# Patient Record
Sex: MALE | Race: WHITE | Employment: OTHER | URBAN - METROPOLITAN AREA
[De-identification: names, ages, dates, MRNs, and addresses within clinical notes are randomized per-mention and may not be internally consistent; named-entity substitution may affect disease eponyms.]

---

## 2020-02-27 NOTE — PROGRESS NOTES
History of Present Illness - Blas Stout is a 64 year old male here to see me for the first time for nosebleeds.    He tells me that this started about 3 years ago.  It started about three years ago in the winter.  It started to bleed initially on the LEFT side, but now is both.  It would bleed and crust, and he would pick at it.  This would set up a vicious cycle of bleeding and crusting.  No previous ENT surgery.  No history of any nasal trauma, or change in nasal airway or vision.    Past Medical History -   Patient Active Problem List   Diagnosis     Hypercholesterolemia       Current Medications -   Current Outpatient Medications:      atorvastatin (LIPITOR) 10 MG tablet, Take 10 mg by mouth daily, Disp: , Rfl:      sildenafil (REVATIO) 20 MG tablet, TAKE 1-3 PILLS AT LEAST 1/2-HOUR BEFORE INTERCOURSE, Disp: , Rfl:      sildenafil (VIAGRA) 50 MG tablet, Take 1 tablet by mouth once daily if needed for Erectile Dysfunction. Take 30 min to 4 hours before sexual activity. Max 100mg/24hr, Disp: , Rfl:     Allergies - Not on File    Social History -   Social History     Socioeconomic History     Marital status:      Spouse name: Not on file     Number of children: Not on file     Years of education: Not on file     Highest education level: Not on file   Occupational History     Not on file   Social Needs     Financial resource strain: Not on file     Food insecurity:     Worry: Not on file     Inability: Not on file     Transportation needs:     Medical: Not on file     Non-medical: Not on file   Tobacco Use     Smoking status: Not on file   Substance and Sexual Activity     Alcohol use: Not on file     Drug use: Not on file     Sexual activity: Not on file   Lifestyle     Physical activity:     Days per week: Not on file     Minutes per session: Not on file     Stress: Not on file   Relationships     Social connections:     Talks on phone: Not on file     Gets together: Not on file     Attends [employer]  service: Not on file     Active member of club or organization: Not on file     Attends meetings of clubs or organizations: Not on file     Relationship status: Not on file     Intimate partner violence:     Fear of current or ex partner: Not on file     Emotionally abused: Not on file     Physically abused: Not on file     Forced sexual activity: Not on file   Other Topics Concern     Not on file   Social History Narrative     Not on file       Family History - No family history on file.    Review of Systems - As per HPI and PMHx, otherwise 10+ system review of the head and neck, and general constitution is negative.    Physical Exam  BP (!) 143/75   Pulse 52   Resp 14   SpO2 99%     General - The patient is well nourished and well developed, and appears to have good nutritional status.  Alert and oriented to person and place, answers questions and cooperates with examination appropriately.   Head and Face - Normocephalic and atraumatic, with no gross asymmetry noted of the contour of the facial features.  The facial nerve is intact, with strong symmetric movements.  Voice and Breathing - The patient was breathing comfortably without the use of accessory muscles. There was no wheezing, stridor, or stertor.  The patients voice was clear and strong, and had appropriate pitch and quality.  Ears - The tympanic membranes are normal in appearance, bony landmarks are intact.  No retraction, perforation, or masses.  No fluid or purulence was seen in the external canal or the middle ear. No evidence of infection of the middle ear or external canal, cerumen was normal in appearance.  Eyes - Extraocular movements intact, and the pupils were reactive to light.  Sclera were not icteric or injected, conjunctiva were pink and moist.  Mouth - Examination of the oral cavity showed pink, healthy oral mucosa. No lesions or ulcerations noted.  The tongue was mobile and midline, and the dentition were in good condition.    Throat -  The walls of the oropharynx were smooth, pink, moist, symmetric, and had no lesions or ulcerations.  The tonsillar pillars and soft palate were symmetric.  The uvula was midline on elevation.    Neck - Normal midline excursion of the laryngotracheal complex during swallowing.  Full range of motion on passive movement.  Palpation of the occipital, submental, submandibular, internal jugular chain, and supraclavicular nodes did not demonstrate any abnormal lymph nodes or masses.  The carotid pulse was palpable bilaterally.  Palpation of the thyroid was soft and smooth, with no nodules or goiter appreciated.  The trachea was mobile and midline.  Nose - External contour is symmetric, no gross deflection or scars.  Nasal mucosa is pink and moist with no abnormal mucus.  The septum shows a shallow excoriated ulceration bilaterally, LEFT greater than RIGHT.  No otther polyps or masses.        A/P - Blas Stout is a 64 year old male  (R04.0) Epistaxis  (primary encounter diagnosis)    The patient has been counseled that this is a vicious cycle of trauma to the mucosa, with crusting and picking that keeps digging out the ulcer deeper.    I will place him on Cleocin Gel and have him avoid pciking and this should allow it to heal.

## 2020-03-02 ENCOUNTER — OFFICE VISIT (OUTPATIENT)
Dept: OTOLARYNGOLOGY | Facility: CLINIC | Age: 65
End: 2020-03-02
Payer: COMMERCIAL

## 2020-03-02 VITALS
SYSTOLIC BLOOD PRESSURE: 143 MMHG | HEART RATE: 52 BPM | DIASTOLIC BLOOD PRESSURE: 75 MMHG | OXYGEN SATURATION: 99 % | RESPIRATION RATE: 14 BRPM

## 2020-03-02 DIAGNOSIS — E78.00 HYPERCHOLESTEROLEMIA: ICD-10-CM

## 2020-03-02 DIAGNOSIS — R04.0 EPISTAXIS: Primary | ICD-10-CM

## 2020-03-02 PROCEDURE — 99203 OFFICE O/P NEW LOW 30 MIN: CPT | Performed by: OTOLARYNGOLOGY

## 2020-03-02 RX ORDER — SILDENAFIL CITRATE 20 MG/1
TABLET ORAL
COMMUNITY
Start: 2019-05-23

## 2020-03-02 RX ORDER — CLINDAMYCIN PHOSPHATE 10 MG/G
1 GEL TOPICAL 2 TIMES DAILY
Qty: 30 G | Refills: 4 | Status: SHIPPED | OUTPATIENT
Start: 2020-03-02 | End: 2020-03-12

## 2020-03-02 RX ORDER — SILDENAFIL 50 MG/1
TABLET, FILM COATED ORAL
COMMUNITY
Start: 2020-02-14

## 2020-03-02 RX ORDER — ATORVASTATIN CALCIUM 10 MG/1
10 TABLET, FILM COATED ORAL DAILY
COMMUNITY
Start: 2020-01-22

## 2020-03-02 ASSESSMENT — PAIN SCALES - GENERAL: PAINLEVEL: NO PAIN (0)

## 2020-03-02 NOTE — PATIENT INSTRUCTIONS
Blas to follow up with Primary Care provider regarding elevated blood pressure.  Scheduling Information  To schedule your CT/MRI scan, please contact [facility] at [phone] OR [facility] at [phone]    To schedule your Surgery, please contact our Specialty Schedulers at [phone]      ENT Clinic Locations Clinic Hours Telephone Number     [facility]  [address] [address]   Monday:           1:00pm -- 5:00pm    Friday:              8:00am - 12:00pm   To schedule/reschedule an appointment with   Dr. Denton,   please contact our   Specialty Scheduling Department at:     [phone]       [facility] [place]  [address] [place] [address] Tuesday:          8:00am -- 2:00pm         Urgent Care Locations Clinic Hours Telephone Numbers     [facility] [place]  [address] [place] [address]     Monday-Friday:     11:00am - 9:00pm    Saturday-Sunday:  9:00am - 5:00pm   [phone]     [facility]  [address] [place] [address]     Monday-Friday:      5:00pm - 9:00pm     Saturday-Sunday:  9:00am - 5:00pm   [phone]

## 2020-03-02 NOTE — LETTER
3/2/2020         RE: Blas Stout  [address] [facility] [address]        Dear Colleague,    Thank you for referring your patient, Blas Stout, to the [facility]. Please see a copy of my visit note below.    History of Present Illness - Blas Stout is a 64 year old male here to see me for the first time for nosebleeds.    He tells me that this started about 3 years ago.  It started about three years ago in the winter.  It started to bleed initially on the LEFT side, but now is both.  It would bleed and crust, and he would pick at it.  This would set up a vicious cycle of bleeding and crusting.  No previous ENT surgery.  No history of any nasal trauma, or change in nasal airway or vision.    Past Medical History -   Patient Active Problem List   Diagnosis     Hypercholesterolemia       Current Medications -   Current Outpatient Medications:      atorvastatin (LIPITOR) 10 MG tablet, Take 10 mg by mouth daily, Disp: , Rfl:      sildenafil (REVATIO) 20 MG tablet, TAKE 1-3 PILLS AT LEAST 1/2-HOUR BEFORE INTERCOURSE, Disp: , Rfl:      sildenafil (VIAGRA) 50 MG tablet, Take 1 tablet by mouth once daily if needed for Erectile Dysfunction. Take 30 min to 4 hours before sexual activity. Max 100mg/24hr, Disp: , Rfl:     Allergies - Not on File    Social History -   Social History     Socioeconomic History     Marital status:      Spouse name: Not on file     Number of children: Not on file     Years of education: Not on file     Highest education level: Not on file   Occupational History     Not on file   Social Needs     Financial resource strain: Not on file     Food insecurity:     Worry: Not on file     Inability: Not on file     Transportation needs:     Medical: Not on file     Non-medical: Not on file   Tobacco Use     Smoking status: Not on file   Substance and Sexual Activity     Alcohol use: Not on file     Drug use: Not on file     Sexual activity: Not on file   Lifestyle      Physical activity:     Days per week: Not on file     Minutes per session: Not on file     Stress: Not on file   Relationships     Social connections:     Talks on phone: Not on file     Gets together: Not on file     Attends Taoism service: Not on file     Active member of club or organization: Not on file     Attends meetings of clubs or organizations: Not on file     Relationship status: Not on file     Intimate partner violence:     Fear of current or ex partner: Not on file     Emotionally abused: Not on file     Physically abused: Not on file     Forced sexual activity: Not on file   Other Topics Concern     Not on file   Social History Narrative     Not on file       Family History - No family history on file.    Review of Systems - As per HPI and PMHx, otherwise 10+ system review of the head and neck, and general constitution is negative.    Physical Exam  BP (!) 143/75   Pulse 52   Resp 14   SpO2 99%     General - The patient is well nourished and well developed, and appears to have good nutritional status.  Alert and oriented to person and place, answers questions and cooperates with examination appropriately.   Head and Face - Normocephalic and atraumatic, with no gross asymmetry noted of the contour of the facial features.  The facial nerve is intact, with strong symmetric movements.  Voice and Breathing - The patient was breathing comfortably without the use of accessory muscles. There was no wheezing, stridor, or stertor.  The patients voice was clear and strong, and had appropriate pitch and quality.  Ears - The tympanic membranes are normal in appearance, bony landmarks are intact.  No retraction, perforation, or masses.  No fluid or purulence was seen in the external canal or the middle ear. No evidence of infection of the middle ear or external canal, cerumen was normal in appearance.  Eyes - Extraocular movements intact, and the pupils were reactive to light.  Sclera were not icteric or  injected, conjunctiva were pink and moist.  Mouth - Examination of the oral cavity showed pink, healthy oral mucosa. No lesions or ulcerations noted.  The tongue was mobile and midline, and the dentition were in good condition.    Throat - The walls of the oropharynx were smooth, pink, moist, symmetric, and had no lesions or ulcerations.  The tonsillar pillars and soft palate were symmetric.  The uvula was midline on elevation.    Neck - Normal midline excursion of the laryngotracheal complex during swallowing.  Full range of motion on passive movement.  Palpation of the occipital, submental, submandibular, internal jugular chain, and supraclavicular nodes did not demonstrate any abnormal lymph nodes or masses.  The carotid pulse was palpable bilaterally.  Palpation of the thyroid was soft and smooth, with no nodules or goiter appreciated.  The trachea was mobile and midline.  Nose - External contour is symmetric, no gross deflection or scars.  Nasal mucosa is pink and moist with no abnormal mucus.  The septum shows a shallow excoriated ulceration bilaterally, LEFT greater than RIGHT.  No otther polyps or masses.        A/P - Blas Stout is a 64 year old male  (R04.0) Epistaxis  (primary encounter diagnosis)    The patient has been counseled that this is a vicious cycle of trauma to the mucosa, with crusting and picking that keeps digging out the ulcer deeper.    I will place him on Cleocin Gel and have him avoid pciking and this should allow it to heal.    Again, thank you for allowing me to participate in the care of your patient.        Sincerely,        Antonio Denton MD

## 2020-08-20 ENCOUNTER — OFFICE VISIT (OUTPATIENT)
Dept: OTOLARYNGOLOGY | Facility: CLINIC | Age: 65
End: 2020-08-20
Payer: COMMERCIAL

## 2020-08-20 ENCOUNTER — NURSE TRIAGE (OUTPATIENT)
Dept: NURSING | Facility: CLINIC | Age: 65
End: 2020-08-20

## 2020-08-20 VITALS — HEART RATE: 63 BPM | DIASTOLIC BLOOD PRESSURE: 76 MMHG | SYSTOLIC BLOOD PRESSURE: 134 MMHG | OXYGEN SATURATION: 99 %

## 2020-08-20 DIAGNOSIS — K13.79 MOUTH SORES: Primary | ICD-10-CM

## 2020-08-20 DIAGNOSIS — K13.21 LEUKOPLAKIA OF ORAL MUCOSA, INCLUDING TONGUE: ICD-10-CM

## 2020-08-20 PROCEDURE — 99214 OFFICE O/P EST MOD 30 MIN: CPT | Performed by: OTOLARYNGOLOGY

## 2020-08-20 RX ORDER — TRIAMCINOLONE ACETONIDE 0.1 %
PASTE (GRAM) DENTAL 2 TIMES DAILY
Qty: 5 G | Refills: 11 | Status: SHIPPED | OUTPATIENT
Start: 2020-08-20 | End: 2021-01-25

## 2020-08-20 NOTE — PROGRESS NOTES
History of Present Illness - Blas Stout is a 64 year old male here to see me for mouth sores.  I have seen him before for nose bleeds, but this is a different issue.    He tells me that about 5-6 months ago he noted a bit of a sore inside the upper lip.  He sent to his dentist and was given Nystatin, which did not really help. He was then given Triamcinolone orabase, and that helped but did not help totally.  He was then prescribed Magic Mouthwash.    This sore comes and goes.  But he describes it as a white sore or stripe on the inside of the upper lip.  The pain comes and goes by the day.      Past Medical History -   Patient Active Problem List   Diagnosis     Hypercholesterolemia       Current Medications -   Current Outpatient Medications:      atorvastatin (LIPITOR) 10 MG tablet, Take 10 mg by mouth daily, Disp: , Rfl:      sildenafil (REVATIO) 20 MG tablet, TAKE 1-3 PILLS AT LEAST 1/2-HOUR BEFORE INTERCOURSE, Disp: , Rfl:      sildenafil (VIAGRA) 50 MG tablet, Take 1 tablet by mouth once daily if needed for Erectile Dysfunction. Take 30 min to 4 hours before sexual activity. Max 100mg/24hr, Disp: , Rfl:     Allergies - Not on File    Social History -   Social History     Socioeconomic History     Marital status:      Spouse name: Not on file     Number of children: Not on file     Years of education: Not on file     Highest education level: Not on file   Occupational History     Not on file   Social Needs     Financial resource strain: Not on file     Food insecurity     Worry: Not on file     Inability: Not on file     Transportation needs     Medical: Not on file     Non-medical: Not on file   Tobacco Use     Smoking status: Never Smoker     Smokeless tobacco: Never Used   Substance and Sexual Activity     Alcohol use: Not on file     Drug use: Not on file     Sexual activity: Not on file   Lifestyle     Physical activity     Days per week: Not on file     Minutes per session: Not on file      Stress: Not on file   Relationships     Social connections     Talks on phone: Not on file     Gets together: Not on file     Attends [employer] service: Not on file     Active member of club or organization: Not on file     Attends meetings of clubs or organizations: Not on file     Relationship status: Not on file     Intimate partner violence     Fear of current or ex partner: Not on file     Emotionally abused: Not on file     Physically abused: Not on file     Forced sexual activity: Not on file   Other Topics Concern     Not on file   Social History Narrative     Not on file       Family History - No family history on file.    Review of Systems - As per HPI and PMHx, otherwise 10+ system review of the head and neck, and general constitution is negative.    Physical Exam  /76   Pulse 63   SpO2 99%     General - The patient is well nourished and well developed, and appears to have good nutritional status.  Alert and oriented to person and place, answers questions and cooperates with examination appropriately.   Head and Face - Normocephalic and atraumatic, with no gross asymmetry noted of the contour of the facial features.  The facial nerve is intact, with strong symmetric movements.  Voice and Breathing - The patient was breathing comfortably without the use of accessory muscles. There was no wheezing, stridor, or stertor.  The patients voice was clear and strong, and had appropriate pitch and quality.  Ears - The tympanic membranes are normal in appearance, bony landmarks are intact.  No retraction, perforation, or masses.  No fluid or purulence was seen in the external canal or the middle ear. No evidence of infection of the middle ear or external canal, cerumen was normal in appearance.  Eyes - Extraocular movements intact, and the pupils were reactive to light.  Sclera were not icteric or injected, conjunctiva were pink and moist.  Mouth - the lesion in question was noted along the mucosal surface  of the lower lip as a lacy white superficial. Examination of the oral cavity showed pink, healthy oral mucosa. No lesions or ulcerations noted.  The tongue was mobile and midline, and the dentition were in good condition.    Throat - The walls of the oropharynx were smooth, pink, moist, symmetric, and had no lesions or ulcerations.  The tonsillar pillars and soft palate were symmetric.  The uvula was midline on elevation.    Neck - Normal midline excursion of the laryngotracheal complex during swallowing.  Full range of motion on passive movement.  Palpation of the occipital, submental, submandibular, internal jugular chain, and supraclavicular nodes did not demonstrate any abnormal lymph nodes or masses.  The carotid pulse was palpable bilaterally.  Palpation of the thyroid was soft and smooth, with no nodules or goiter appreciated.  The trachea was mobile and midline.  Nose - External contour is symmetric, no gross deflection or scars.  Nasal mucosa is pink and moist with no abnormal mucus.  The septum was midline and non-obstructive, turbinates of normal size and position.  No polyps, masses, or purulence noted on examination.      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    To treat the leukoplakia, I have recommended several things.  First, we will try Kenalog Orabase gel for a week.  Also, changing his oral hygiene products to Biotene brand can be helpful.  And certainly stop the use of listerine and his habit of picking at the lip.    If no changes, then follow up with me annually for checks up.  Sooner if there are any changes to the lesion.

## 2020-08-20 NOTE — LETTER
8/20/2020         RE: Blas Stout  [address] [facility] [address]        Dear Colleague,    Thank you for referring your patient, Blas Stout, to the [facility]. Please see a copy of my visit note below.    History of Present Illness - Blas Stout is a 64 year old male here to see me for mouth sores.  I have seen him before for nose bleeds, but this is a different issue.    He tells me that about 5-6 months ago he noted a bit of a sore inside the upper lip.  He sent to his dentist and was given Nystatin, which did not really help. He was then given Triamcinolone orabase, and that helped but did not help totally.  He was then prescribed Magic Mouthwash.    This sore comes and goes.  But he describes it as a white sore or stripe on the inside of the upper lip.  The pain comes and goes by the day.      Past Medical History -   Patient Active Problem List   Diagnosis     Hypercholesterolemia       Current Medications -   Current Outpatient Medications:      atorvastatin (LIPITOR) 10 MG tablet, Take 10 mg by mouth daily, Disp: , Rfl:      sildenafil (REVATIO) 20 MG tablet, TAKE 1-3 PILLS AT LEAST 1/2-HOUR BEFORE INTERCOURSE, Disp: , Rfl:      sildenafil (VIAGRA) 50 MG tablet, Take 1 tablet by mouth once daily if needed for Erectile Dysfunction. Take 30 min to 4 hours before sexual activity. Max 100mg/24hr, Disp: , Rfl:     Allergies - Not on File    Social History -   Social History     Socioeconomic History     Marital status:      Spouse name: Not on file     Number of children: Not on file     Years of education: Not on file     Highest education level: Not on file   Occupational History     Not on file   Social Needs     Financial resource strain: Not on file     Food insecurity     Worry: Not on file     Inability: Not on file     Transportation needs     Medical: Not on file     Non-medical: Not on file   Tobacco Use     Smoking status: Never Smoker     Smokeless tobacco:  Never Used   Substance and Sexual Activity     Alcohol use: Not on file     Drug use: Not on file     Sexual activity: Not on file   Lifestyle     Physical activity     Days per week: Not on file     Minutes per session: Not on file     Stress: Not on file   Relationships     Social connections     Talks on phone: Not on file     Gets together: Not on file     Attends Alevism service: Not on file     Active member of club or organization: Not on file     Attends meetings of clubs or organizations: Not on file     Relationship status: Not on file     Intimate partner violence     Fear of current or ex partner: Not on file     Emotionally abused: Not on file     Physically abused: Not on file     Forced sexual activity: Not on file   Other Topics Concern     Not on file   Social History Narrative     Not on file       Family History - No family history on file.    Review of Systems - As per HPI and PMHx, otherwise 10+ system review of the head and neck, and general constitution is negative.    Physical Exam  /76   Pulse 63   SpO2 99%     General - The patient is well nourished and well developed, and appears to have good nutritional status.  Alert and oriented to person and place, answers questions and cooperates with examination appropriately.   Head and Face - Normocephalic and atraumatic, with no gross asymmetry noted of the contour of the facial features.  The facial nerve is intact, with strong symmetric movements.  Voice and Breathing - The patient was breathing comfortably without the use of accessory muscles. There was no wheezing, stridor, or stertor.  The patients voice was clear and strong, and had appropriate pitch and quality.  Ears - The tympanic membranes are normal in appearance, bony landmarks are intact.  No retraction, perforation, or masses.  No fluid or purulence was seen in the external canal or the middle ear. No evidence of infection of the middle ear or external canal, cerumen was  normal in appearance.  Eyes - Extraocular movements intact, and the pupils were reactive to light.  Sclera were not icteric or injected, conjunctiva were pink and moist.  Mouth - the lesion in question was noted along the mucosal surface of the lower lip as a lacy white superficial. Examination of the oral cavity showed pink, healthy oral mucosa. No lesions or ulcerations noted.  The tongue was mobile and midline, and the dentition were in good condition.    Throat - The walls of the oropharynx were smooth, pink, moist, symmetric, and had no lesions or ulcerations.  The tonsillar pillars and soft palate were symmetric.  The uvula was midline on elevation.    Neck - Normal midline excursion of the laryngotracheal complex during swallowing.  Full range of motion on passive movement.  Palpation of the occipital, submental, submandibular, internal jugular chain, and supraclavicular nodes did not demonstrate any abnormal lymph nodes or masses.  The carotid pulse was palpable bilaterally.  Palpation of the thyroid was soft and smooth, with no nodules or goiter appreciated.  The trachea was mobile and midline.  Nose - External contour is symmetric, no gross deflection or scars.  Nasal mucosa is pink and moist with no abnormal mucus.  The septum was midline and non-obstructive, turbinates of normal size and position.  No polyps, masses, or purulence noted on examination.      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    To treat the leukoplakia, I have recommended several things.  First, we will try Kenalog Orabase gel for a week.  Also, changing his oral hygiene products to Biotene brand can be helpful.  And certainly stop the use of listerine and his  habit of picking at the lip.    If no changes, then follow up with me annually for checks up.  Sooner if there are any changes to the lesion.      Again, thank you for allowing me to participate in the care of your patient.        Sincerely,        Antonio Denton MD

## 2020-08-21 NOTE — TELEPHONE ENCOUNTER
"Pharmacist ([facility]) called for medication clarification (Kenalog) paste.  This triage nurse paged the on-call provider ([facility]) at 7.54 pm.  Yanet Colmenares, RN      Reason for Disposition    Pharmacy calling with prescription questions and triager unable to answer question    Additional Information    Negative: Drug overdose and nurse unable to answer question    Negative: Caller requesting information not related to medicine    Negative: Caller requesting a prescription for Strep throat and has a positive culture result    Negative: Rash while taking a medication or within 3 days of stopping it    Negative: Immunization reaction suspected    Negative: [1] Asthma AND [2] having symptoms of asthma (cough, wheezing, etc)    Negative: MORE THAN A DOUBLE DOSE of a prescription or over-the-counter (OTC) drug    Negative: [1] DOUBLE DOSE (an extra dose or lesser amount) of over-the-counter (OTC) drug AND [2] any symptoms (e.g., dizziness, nausea, pain, sleepiness)    Negative: [1] DOUBLE DOSE (an extra dose or lesser amount) of prescription drug AND [2] any symptoms (e.g., dizziness, nausea, pain, sleepiness)    Negative: Took another person's prescription drug    Negative: [1] DOUBLE DOSE (an extra dose or lesser amount) of prescription drug AND [2] NO symptoms (Exception: a double dose of antibiotics)    Negative: Diabetes drug error or overdose (e.g., insulin or extra dose)    Negative: [1] Request for URGENT new prescription or refill of \"essential\" medication (i.e., likelihood of harm to patient if not taken) AND [2] triager unable to fill per unit policy    Negative: [1] Prescription not at pharmacy AND [2] was prescribed today by PCP    Protocols used: MEDICATION QUESTION CALL-A-AH      "

## 2021-01-22 NOTE — PROGRESS NOTES
History of Present Illness - Blas Stout is a 65 year old male here to see me in follow up for mouth sores.  I have seen him before for nose bleeds, but this is a different issue.    To review, at the beginning of 2020 he noted a bit of a sore inside the upper lip.  He sent to his dentist and was given Nystatin, which did not really help. He was then given Triamcinolone orabase, and that helped but did not help totally.  He was then prescribed Magic Mouthwash.    This sore comes and goes.  But he describes it as a white sore or stripe on the inside of the upper lip.  The pain comes and goes by the day.    At the last visit on 8/20/20, things looked stable, no suspicious or ulcerated lesions.     He has come back to me for another check up.  He tells me that two of the spots seem larger, and are sore at times.      Past Medical History -   Patient Active Problem List   Diagnosis     Hypercholesterolemia       Current Medications -   Current Outpatient Medications:      atorvastatin (LIPITOR) 10 MG tablet, Take 10 mg by mouth daily, Disp: , Rfl:      sildenafil (REVATIO) 20 MG tablet, TAKE 1-3 PILLS AT LEAST 1/2-HOUR BEFORE INTERCOURSE, Disp: , Rfl:      sildenafil (VIAGRA) 50 MG tablet, Take 1 tablet by mouth once daily if needed for Erectile Dysfunction. Take 30 min to 4 hours before sexual activity. Max 100mg/24hr, Disp: , Rfl:     Allergies -   Allergies   Allergen Reactions     Penicillins Rash       Social History -   Social History     Socioeconomic History     Marital status:      Spouse name: Not on file     Number of children: Not on file     Years of education: Not on file     Highest education level: Not on file   Occupational History     Not on file   Social Needs     Financial resource strain: Not on file     Food insecurity     Worry: Not on file     Inability: Not on file     Transportation needs     Medical: Not on file     Non-medical: Not on file   Tobacco Use     Smoking status: Never  Smoker     Smokeless tobacco: Never Used   Substance and Sexual Activity     Alcohol use: Not on file     Drug use: Not on file     Sexual activity: Not on file   Lifestyle     Physical activity     Days per week: Not on file     Minutes per session: Not on file     Stress: Not on file   Relationships     Social connections     Talks on phone: Not on file     Gets together: Not on file     Attends Pentecostal service: Not on file     Active member of club or organization: Not on file     Attends meetings of clubs or organizations: Not on file     Relationship status: Not on file     Intimate partner violence     Fear of current or ex partner: Not on file     Emotionally abused: Not on file     Physically abused: Not on file     Forced sexual activity: Not on file   Other Topics Concern     Not on file   Social History Narrative     Not on file       Family History - No family history on file.    Review of Systems - As per HPI and PMHx, otherwise 10+ system review of the head and neck, and general constitution is negative.    Physical Exam  /72   Pulse 72   Resp 16   SpO2 99%     General - The patient is well nourished and well developed, and appears to have good nutritional status.  Alert and oriented to person and place, answers questions and cooperates with examination appropriately.   Head and Face - Normocephalic and atraumatic, with no gross asymmetry noted of the contour of the facial features.  The facial nerve is intact, with strong symmetric movements.  Voice and Breathing - The patient was breathing comfortably without the use of accessory muscles. There was no wheezing, stridor, or stertor.  The patients voice was clear and strong, and had appropriate pitch and quality.  Ears - The tympanic membranes are normal in appearance, bony landmarks are intact.  No retraction, perforation, or masses.  No fluid or purulence was seen in the external canal or the middle ear. No evidence of infection of the  middle ear or external canal, cerumen was normal in appearance.  Eyes - Extraocular movements intact, and the pupils were reactive to light.  Sclera were not icteric or injected, conjunctiva were pink and moist.  Mouth - the lesion in question was noted along the mucosal surface of the lower lip as a lacy white superficial, and there are three areas that seem to be deeper ulcerations, but not full thickness. There is another area on the LEFT upper lip as well. Examination of the oral cavity showed pink, healthy oral mucosa. No lesions or ulcerations noted.  The tongue was mobile and midline, and the dentition were in good condition.    Throat - The walls of the oropharynx were smooth, pink, moist, symmetric, and had no lesions or ulcerations.  The tonsillar pillars and soft palate were symmetric.  The uvula was midline on elevation.          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 He has failed steroid topical therapy.     I am going to refer to Head and Neck at [facility].  I would like him to know further options like resurfacing.

## 2021-01-25 ENCOUNTER — OFFICE VISIT (OUTPATIENT)
Dept: OTOLARYNGOLOGY | Facility: CLINIC | Age: 66
End: 2021-01-25
Payer: COMMERCIAL

## 2021-01-25 VITALS
RESPIRATION RATE: 16 BRPM | OXYGEN SATURATION: 99 % | HEART RATE: 72 BPM | SYSTOLIC BLOOD PRESSURE: 116 MMHG | DIASTOLIC BLOOD PRESSURE: 72 MMHG

## 2021-01-25 DIAGNOSIS — K13.79 MOUTH SORES: Primary | ICD-10-CM

## 2021-01-25 DIAGNOSIS — K13.21 LEUKOPLAKIA OF ORAL MUCOSA, INCLUDING TONGUE: ICD-10-CM

## 2021-01-25 PROCEDURE — 99214 OFFICE O/P EST MOD 30 MIN: CPT | Performed by: OTOLARYNGOLOGY

## 2021-01-25 RX ORDER — TRIAMCINOLONE ACETONIDE 0.1 %
PASTE (GRAM) DENTAL 2 TIMES DAILY
Qty: 5 G | Refills: 11 | Status: SHIPPED | OUTPATIENT
Start: 2021-01-25

## 2021-01-25 ASSESSMENT — PAIN SCALES - GENERAL: PAINLEVEL: NO PAIN (0)

## 2021-01-25 NOTE — PATIENT INSTRUCTIONS
Scheduling Information  To schedule your CT/MRI scan, please contact [facility] at [phone] OR [facility] at [phone]    To schedule your Surgery, please contact our Specialty Schedulers at [phone]      ENT Clinic Locations Clinic Hours Telephone Number     [facility]  [address] [place] [address]   Monday:           1:00pm -- 5:00pm    Friday:              8:00am - 12:00pm   To schedule/reschedule an appointment with   Dr. Denton,   please contact our   Specialty Scheduling Department at:     [phone]       [facility]  [address] [address] Tuesday:          8:00am -- 2:00pm         Urgent Care Locations Clinic Hours Telephone Numbers     [facility]  [address] [place] [address]     Monday-Friday:     11:00am - 9:00pm    Saturday-Sunday:  9:00am - 5:00pm   [phone]     [facility]  [address] [place] [address]     Monday-Friday:      5:00pm - 9:00pm     Saturday-Sunday:  9:00am - 5:00pm   [phone]

## 2021-01-25 NOTE — LETTER
1/25/2021         RE: Blas Stout  [address] [address] [facility] [address]        Dear Colleague,    Thank you for referring your patient, Blas Stout, to the [facility]. Please see a copy of my visit note below.    History of Present Illness - Blas Stout is a 65 year old male here to see me in follow up for mouth sores.  I have seen him before for nose bleeds, but this is a different issue.    To review, at the beginning of 2020 he noted a bit of a sore inside the upper lip.  He sent to his dentist and was given Nystatin, which did not really help. He was then given Triamcinolone orabase, and that helped but did not help totally.  He was then prescribed Magic Mouthwash.    This sore comes and goes.  But he describes it as a white sore or stripe on the inside of the upper lip.  The pain comes and goes by the day.    At the last visit on 8/20/20, things looked stable, no suspicious or ulcerated lesions.     He has come back to me for another check up.  He tells me that two of the spots seem larger, and are sore at times.      Past Medical History -   Patient Active Problem List   Diagnosis     Hypercholesterolemia       Current Medications -   Current Outpatient Medications:      atorvastatin (LIPITOR) 10 MG tablet, Take 10 mg by mouth daily, Disp: , Rfl:      sildenafil (REVATIO) 20 MG tablet, TAKE 1-3 PILLS AT LEAST 1/2-HOUR BEFORE INTERCOURSE, Disp: , Rfl:      sildenafil (VIAGRA) 50 MG tablet, Take 1 tablet by mouth once daily if needed for Erectile Dysfunction. Take 30 min to 4 hours before sexual activity. Max 100mg/24hr, Disp: , Rfl:     Allergies -   Allergies   Allergen Reactions     Penicillins Rash       Social History -   Social History     Socioeconomic History     Marital status:      Spouse name: Not on file     Number of children: Not on file     Years of education: Not on file     Highest education level: Not on file   Occupational History     Not on file    Social Needs     Financial resource strain: Not on file     Food insecurity     Worry: Not on file     Inability: Not on file     Transportation needs     Medical: Not on file     Non-medical: Not on file   Tobacco Use     Smoking status: Never Smoker     Smokeless tobacco: Never Used   Substance and Sexual Activity     Alcohol use: Not on file     Drug use: Not on file     Sexual activity: Not on file   Lifestyle     Physical activity     Days per week: Not on file     Minutes per session: Not on file     Stress: Not on file   Relationships     Social connections     Talks on phone: Not on file     Gets together: Not on file     Attends Rastafarian service: Not on file     Active member of club or organization: Not on file     Attends meetings of clubs or organizations: Not on file     Relationship status: Not on file     Intimate partner violence     Fear of current or ex partner: Not on file     Emotionally abused: Not on file     Physically abused: Not on file     Forced sexual activity: Not on file   Other Topics Concern     Not on file   Social History Narrative     Not on file       Family History - No family history on file.    Review of Systems - As per HPI and PMHx, otherwise 10+ system review of the head and neck, and general constitution is negative.    Physical Exam  /72   Pulse 72   Resp 16   SpO2 99%     General - The patient is well nourished and well developed, and appears to have good nutritional status.  Alert and oriented to person and place, answers questions and cooperates with examination appropriately.   Head and Face - Normocephalic and atraumatic, with no gross asymmetry noted of the contour of the facial features.  The facial nerve is intact, with strong symmetric movements.  Voice and Breathing - The patient was breathing comfortably without the use of accessory muscles. There was no wheezing, stridor, or stertor.  The patients voice was clear and strong, and had appropriate  pitch and quality.  Ears - The tympanic membranes are normal in appearance, bony landmarks are intact.  No retraction, perforation, or masses.  No fluid or purulence was seen in the external canal or the middle ear. No evidence of infection of the middle ear or external canal, cerumen was normal in appearance.  Eyes - Extraocular movements intact, and the pupils were reactive to light.  Sclera were not icteric or injected, conjunctiva were pink and moist.  Mouth - the lesion in question was noted along the mucosal surface of the lower lip as a lacy white superficial, and there are three areas that seem to be deeper ulcerations, but not full thickness. There is another area on the LEFT upper lip as well. Examination of the oral cavity showed pink, healthy oral mucosa. No lesions or ulcerations noted.  The tongue was mobile and midline, and the dentition were in good condition.    Throat - The walls of the oropharynx were smooth, pink, moist, symmetric, and had no lesions or ulcerations.  The tonsillar pillars and soft palate were symmetric.  The uvula was midline on elevation.          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 He has failed steroid topical therapy.     I am going to refer to Head and Neck at [facility].  I would like him to know further options like resurfacing.            Again, thank you for allowing me to participate in the care of your patient.        Sincerely,        Antonio Denton MD

## 2021-01-26 ENCOUNTER — TELEPHONE (OUTPATIENT)
Dept: OTOLARYNGOLOGY | Facility: CLINIC | Age: 66
End: 2021-01-26
Payer: COMMERCIAL

## 2021-01-26 NOTE — TELEPHONE ENCOUNTER
"[facility] Call Center    Phone Message    May a detailed message be left on voicemail: no     Reason for Call: Appointment Intake    Referring Provider Name: Antonio Denton MD in [facility] - \"For Head and Neck, Dr. Zapata\"  Diagnosis and/or Symptoms: persistent and worsening Leukoplakia    Mouth sores [K13.79]  Leukoplakia of oral mucosa, including tongue [K13.21]    Action Taken: Message routed to:  [facility] ([facility]): [facility] [[id number]]    Travel Screening: Not Applicable    Per protocols -  If a patient is being referred directly to a provider, but that provider is not listed for that diagnosis, send encounter to clinic pool for review.  "

## 2021-01-26 NOTE — TELEPHONE ENCOUNTER
FUTURE VISIT INFORMATION      FUTURE VISIT INFORMATION:    Date: 1/29/2021    Time: 1:20PM    Location: [facility]  REFERRAL INFORMATION:    Referring provider:  Antonio Denton MD    Referring providers clinic:  [facility]     Reason for visit/diagnosis  Leukoplakia of oral mucosa, including tongue    RECORDS REQUESTED FROM:       Clinic name Comments Records Status Imaging Status   [facility]  1/25/2021 referral and note from Antonio Denton MD Epic

## 2021-01-29 ENCOUNTER — OFFICE VISIT (OUTPATIENT)
Dept: OTOLARYNGOLOGY | Facility: CLINIC | Age: 66
End: 2021-01-29
Payer: COMMERCIAL

## 2021-01-29 ENCOUNTER — PRE VISIT (OUTPATIENT)
Dept: OTOLARYNGOLOGY | Facility: CLINIC | Age: 66
End: 2021-01-29

## 2021-01-29 VITALS
HEIGHT: 70 IN | BODY MASS INDEX: 24.4 KG/M2 | TEMPERATURE: 96.9 F | WEIGHT: 170.42 LBS | HEART RATE: 58 BPM | OXYGEN SATURATION: 99 %

## 2021-01-29 DIAGNOSIS — K13.79 MOUTH SORES: Primary | ICD-10-CM

## 2021-01-29 PROCEDURE — 99213 OFFICE O/P EST LOW 20 MIN: CPT | Performed by: OTOLARYNGOLOGY

## 2021-01-29 ASSESSMENT — MIFFLIN-ST. JEOR: SCORE: 1559.25

## 2021-01-29 ASSESSMENT — PAIN SCALES - GENERAL: PAINLEVEL: NO PAIN (0)

## 2021-01-29 NOTE — PROGRESS NOTES
"Dear Dr. Denton:    I had the pleasure of meeting Blas Stout in consultation today at the [facility] at your request.     History of Present Illness:   Blas Stout is a 65 year old man referred for evaluation of oral lesions. He saw Dr Denton in August 2020. At that time he had a 5-6 month history of a sore along the lip. This was evaluated by his dentist and treated with nystatin without improvement. He then was given Triamcinolone with some improvement but not complete resolution. He was then given magic mouthrinse. At that time the sore was intermittent. Dr Denton recommended kenalog and adjustment of oral hygiene products to biotene. He saw Dr Denton in follow-up in January 2021 at which time he reported the spots were larger and more symptomatic.     The patient says that he will wake up in the morning and will be fine, but then will start to notice after he eats breakfast.  They are primarily along the lip, both the upper and the lower lip.  The area gets worse throughout the day.  In the evening he does have white spots that will be present.  He is able to pick the areas off with his finger.  He says today he has not had any issues and they have actually been getting progressively better over the last week.  He says that when he saw Dr. Denton they were particularly bad.  The patient says that they are not interfering with his eating.  He does have a \"strange sensation\" with them.  He says there will be little bumps along his lip.  He does have bleeding when he will bite them which he does daily.  He has no ear pain.  He just places Vaseline on his lips on a nightly basis.  He says the only change over the last week as he has not had any alcohol.    He has no history of Crohn's disease or ulcerative colitis.  He does have a history of H. pylori that was treated with antibiotics, repeat testing was negative.        Past medical history: hyperlipidemia    Past surgical history: " Negative    Social history: Some smoking in teens. No chewing tobacco. Alcohol 3-4 times per week, have few glasses. Retired 1 year ago.     Family history: Negative     MEDICATIONS:     Current Outpatient Medications   Medication Sig Dispense Refill     atorvastatin (LIPITOR) 10 MG tablet Take 10 mg by mouth daily       sildenafil (REVATIO) 20 MG tablet TAKE 1-3 PILLS AT LEAST 1/2-HOUR BEFORE INTERCOURSE       sildenafil (VIAGRA) 50 MG tablet Take 1 tablet by mouth once daily if needed for Erectile Dysfunction. Take 30 min to 4 hours before sexual activity. Max 100mg/24hr       triamcinolone (KENALOG) 0.1 % paste Take by mouth 2 times daily 5 g 11       ALLERGIES:    Allergies   Allergen Reactions     Penicillins Rash       HABITS/SOCIAL HISTORY:   Some smoking in teens. No chewing tobacco. Alcohol 3-4 times per week, have few glasses.   Retired 1 year ago.     Social History     Socioeconomic History     Marital status:      Spouse name: Not on file     Number of children: Not on file     Years of education: Not on file     Highest education level: Not on file   Occupational History     Not on file   Social Needs     Financial resource strain: Not on file     Food insecurity     Worry: Not on file     Inability: Not on file     Transportation needs     Medical: Not on file     Non-medical: Not on file   Tobacco Use     Smoking status: Never Smoker     Smokeless tobacco: Never Used   Substance and Sexual Activity     Alcohol use: Not on file     Drug use: Not on file     Sexual activity: Not on file   Lifestyle     Physical activity     Days per week: Not on file     Minutes per session: Not on file     Stress: Not on file   Relationships     Social connections     Talks on phone: Not on file     Gets together: Not on file     Attends Restorationist service: Not on file     Active member of club or organization: Not on file     Attends meetings of clubs or organizations: Not on file     Relationship status: Not  "on file     Intimate partner violence     Fear of current or ex partner: Not on file     Emotionally abused: Not on file     Physically abused: Not on file     Forced sexual activity: Not on file   Other Topics Concern     Not on file   Social History Narrative     Not on file       PAST MEDICAL HISTORY: No past medical history on file.     PAST SURGICAL HISTORY: No past surgical history on file.    FAMILY HISTORY:  No family history on file.    REVIEW OF SYSTEMS:  12 point ROS was negative other than the symptoms noted above in the HPI.  Patient Supplied Answers to Review of Systems  No flowsheet data found.      PHYSICAL EXAMINATION:   Pulse 58   Temp 96.9  F (36.1  C) (Temporal)   Ht 1.778 m (5' 10\")   Wt 77.3 kg (170 lb 6.7 oz)   SpO2 99%   BMI 24.45 kg/m     Appearance:   normal; NAD, age-appropriate appearance, well-developed, normal habitus   Communication:   normal; communicates verbally, normal voice quality   Head/Face:   inspection -  Normal; no scars or visible lesions   Salivary glands -  Normal size, no tenderness, swelling, or palpable masses   Facial strength -  Normal and symmetric    Skin:  normal, no rash   Ears:  auricle (AD) -  normal  EAC (AD) -  normal  TM (AD) -  Normal, no effusion  auricle (AS) -  normal  EAC (AS) -  normal  TM (AS) -  Normal, no effusion  Normal clinical speech reception   Nose:  Ext. inspection -  Normal   Oral Cavity:  lips -  Normal mucosa, oral competence, and stoma size  The previously identified areas along the lip seen when he was evaluated by Dr. Denton have since resolved.  There are palpable minor salivary glands along the lip but no masses that are concerning.  There is a slightly white/pale appearance to the inner lip along the lower lip.  There are no findings along upper lip.   Age-appropriate dentition, healthy gingival mucosa   Hard palate, buccal, floor of mouth mucosa normal   Tongue - normal movement, no lesions   Oropharynx:  mucosa -  Normal, no " lesions  soft palate -  Normal, no lesions, no asymmetry, normal elevation  tonsils -  Normal, no exudates, no abnormal lesions, symmetric   Neck: No visible mass or asymmetry   Normal palpation, no tenderness, no tracheal deviation  Normal range of motion   Lymphatic:  no abnormal nodes   Cardiovascular: warm, pink, well-perfused extremities without swelling, tenderness, or edema   Respiratory:  Normal respiratory effort, no stridor   Neuro/Psych.:  mood/affect -  normal  mental status -  normal         RESULTS REVIEWED:   I reviewed the notes from Dr Denton (August 2020 and January 2021) which are summarized above      IMPRESSION AND PLAN:   Blas Stout is a 65-year-old man who is referred for evaluation of lip lesions.  He does not have them on exam today.  He says that over the last week they have improved significantly from when he saw Dr. Denton.  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  I offered him a biopsy today.  We discussed an alternative of if the lesions recur that he contact sesame try and bring him in when he has an actual lesion to biopsy to get some more information.  I did discuss with him that it might be worth seeing if abstaining from alcohol keeps him from recurring since the only change he notices in the last week since they were particularly bad was not consuming any alcohol.  He was given my nurses direct number to contact us if the lesion is to recur.  If he cannot be seen in my clinic at the time of his call, he can potentially be seen by our PAT.  I discussed the patient's care with her at the time of the patient's visit to make her aware of the situation.    Thank you very much for the opportunity to participate in the care of your patient.      Daly Zapata MD, M.D.  Otolaryngology- Head & Neck Surgery      This note was dictated with voice recognition software and then edited. Please excuse any unintentional errors.       CC:  Antonio  MD Corrie  [facility]  [address] [place]  [address]

## 2021-01-29 NOTE — LETTER
"1/29/2021       RE: Blas Stout  [address] [facility] [place] [address]     Dear Colleague,    Thank you for referring your patient, Blas Stout, to the [facility] EAR NOSE AND THROAT CLINIC [place] at [facility]. Please see a copy of my visit note below.    Dear Dr. eDnton:    I had the pleasure of meeting Blas Stout in consultation today at the [facility] at your request.     History of Present Illness:   Blas Stout is a 65 year old man referred for evaluation of oral lesions. He saw Dr Denton in August 2020. At that time he had a 5-6 month history of a sore along the lip. This was evaluated by his dentist and treated with nystatin without improvement. He then was given Triamcinolone with some improvement but not complete resolution. He was then given magic mouthrinse. At that time the sore was intermittent. Dr Denton recommended kenalog and adjustment of oral hygiene products to biotene. He saw Dr Denton in follow-up in January 2021 at which time he reported the spots were larger and more symptomatic.     The patient says that he will wake up in the morning and will be fine, but then will start to notice after he eats breakfast.  They are primarily along the lip, both the upper and the lower lip.  The area gets worse throughout the day.  In the evening he does have white spots that will be present.  He is able to pick the areas off with his finger.  He says today he has not had any issues and they have actually been getting progressively better over the last week.  He says that when he saw Dr. Denton they were particularly bad.  The patient says that they are not interfering with his eating.  He does have a \"strange sensation\" with them.  He says there will be little bumps along his lip.  He does have bleeding when he will bite them which he does daily.  He has no ear pain.  He just places Vaseline on his lips on a nightly " basis.  He says the only change over the last week as he has not had any alcohol.    He has no history of Crohn's disease or ulcerative colitis.  He does have a history of H. pylori that was treated with antibiotics, repeat testing was negative.        Past medical history: hyperlipidemia    Past surgical history: Negative    Social history: Some smoking in teens. No chewing tobacco. Alcohol 3-4 times per week, have few glasses. Retired 1 year ago.     Family history: Negative     MEDICATIONS:     Current Outpatient Medications   Medication Sig Dispense Refill     atorvastatin (LIPITOR) 10 MG tablet Take 10 mg by mouth daily       sildenafil (REVATIO) 20 MG tablet TAKE 1-3 PILLS AT LEAST 1/2-HOUR BEFORE INTERCOURSE       sildenafil (VIAGRA) 50 MG tablet Take 1 tablet by mouth once daily if needed for Erectile Dysfunction. Take 30 min to 4 hours before sexual activity. Max 100mg/24hr       triamcinolone (KENALOG) 0.1 % paste Take by mouth 2 times daily 5 g 11       ALLERGIES:    Allergies   Allergen Reactions     Penicillins Rash       HABITS/SOCIAL HISTORY:   Some smoking in teens. No chewing tobacco. Alcohol 3-4 times per week, have few glasses.   Retired 1 year ago.     Social History     Socioeconomic History     Marital status:      Spouse name: Not on file     Number of children: Not on file     Years of education: Not on file     Highest education level: Not on file   Occupational History     Not on file   Social Needs     Financial resource strain: Not on file     Food insecurity     Worry: Not on file     Inability: Not on file     Transportation needs     Medical: Not on file     Non-medical: Not on file   Tobacco Use     Smoking status: Never Smoker     Smokeless tobacco: Never Used   Substance and Sexual Activity     Alcohol use: Not on file     Drug use: Not on file     Sexual activity: Not on file   Lifestyle     Physical activity     Days per week: Not on file     Minutes per session: Not on  "file     Stress: Not on file   Relationships     Social connections     Talks on phone: Not on file     Gets together: Not on file     Attends [employer] service: Not on file     Active member of club or organization: Not on file     Attends meetings of clubs or organizations: Not on file     Relationship status: Not on file     Intimate partner violence     Fear of current or ex partner: Not on file     Emotionally abused: Not on file     Physically abused: Not on file     Forced sexual activity: Not on file   Other Topics Concern     Not on file   Social History Narrative     Not on file       PAST MEDICAL HISTORY: No past medical history on file.     PAST SURGICAL HISTORY: No past surgical history on file.    FAMILY HISTORY:  No family history on file.    REVIEW OF SYSTEMS:  12 point ROS was negative other than the symptoms noted above in the HPI.  Patient Supplied Answers to Review of Systems  No flowsheet data found.      PHYSICAL EXAMINATION:   Pulse 58   Temp 96.9  F (36.1  C) (Temporal)   Ht 1.778 m (5' 10\")   Wt 77.3 kg (170 lb 6.7 oz)   SpO2 99%   BMI 24.45 kg/m     Appearance:   normal; NAD, age-appropriate appearance, well-developed, normal habitus   Communication:   normal; communicates verbally, normal voice quality   Head/Face:   inspection -  Normal; no scars or visible lesions   Salivary glands -  Normal size, no tenderness, swelling, or palpable masses   Facial strength -  Normal and symmetric    Skin:  normal, no rash   Ears:  auricle (AD) -  normal  EAC (AD) -  normal  TM (AD) -  Normal, no effusion  auricle (AS) -  normal  EAC (AS) -  normal  TM (AS) -  Normal, no effusion  Normal clinical speech reception   Nose:  Ext. inspection -  Normal   Oral Cavity:  lips -  Normal mucosa, oral competence, and stoma size  The previously identified areas along the lip seen when he was evaluated by Dr. Denton have since resolved.  There are palpable minor salivary glands along the lip but no masses that " are concerning.  There is a slightly white/pale appearance to the inner lip along the lower lip.  There are no findings along upper lip.   Age-appropriate dentition, healthy gingival mucosa   Hard palate, buccal, floor of mouth mucosa normal   Tongue - normal movement, no lesions   Oropharynx:  mucosa -  Normal, no lesions  soft palate -  Normal, no lesions, no asymmetry, normal elevation  tonsils -  Normal, no exudates, no abnormal lesions, symmetric   Neck: No visible mass or asymmetry   Normal palpation, no tenderness, no tracheal deviation  Normal range of motion   Lymphatic:  no abnormal nodes   Cardiovascular: warm, pink, well-perfused extremities without swelling, tenderness, or edema   Respiratory:  Normal respiratory effort, no stridor   Neuro/Psych.:  mood/affect -  normal  mental status -  normal         RESULTS REVIEWED:   I reviewed the notes from Dr Denton (August 2020 and January 2021) which are summarized above      IMPRESSION AND PLAN:   Blas Stout is a 65-year-old man who is referred for evaluation of lip lesions.  He does not have them on exam today.  He says that over the last week they have improved significantly from when he saw Dr. Denton.  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  I offered him a biopsy today.  We discussed an alternative of if the lesions recur that he contact sesame try and bring him in when he has an actual lesion to biopsy to get some more information.  I did discuss with him that it might be worth seeing if abstaining from alcohol keeps him from recurring since the only change he notices in the last week since they were particularly bad was not consuming any alcohol.  He was given my nurses direct number to contact us if the lesion is to recur.  If he cannot be seen in my clinic at the time of his call, he can potentially be seen by our PAT.  I discussed the patient's care with her at the time of the patient's visit to make her  aware of the situation.    Thank you very much for the opportunity to participate in the care of your patient.      Daly Zapata MD, M.D.  Otolaryngology- Head & Neck Surgery      This note was dictated with voice recognition software and then edited. Please excuse any unintentional errors.       CC:  [place] [address]

## 2021-01-29 NOTE — NURSING NOTE
"Chief Complaint   Patient presents with     Consult     Mouth sore, leukoplakia of oral mucosa       Pulse 58, temperature 96.9  F (36.1  C), temperature source Temporal, height 1.778 m (5' 10\"), weight 77.3 kg (170 lb 6.7 oz), SpO2 99 %.    Sarah Freeman, EMT    "

## 2021-03-06 ENCOUNTER — HEALTH MAINTENANCE LETTER (OUTPATIENT)
Age: 66
End: 2021-03-06

## 2021-08-04 ENCOUNTER — OFFICE VISIT (OUTPATIENT)
Dept: OTOLARYNGOLOGY | Facility: CLINIC | Age: 66
End: 2021-08-04
Payer: COMMERCIAL

## 2021-08-04 VITALS
TEMPERATURE: 97.3 F | DIASTOLIC BLOOD PRESSURE: 73 MMHG | OXYGEN SATURATION: 98 % | BODY MASS INDEX: 23.45 KG/M2 | SYSTOLIC BLOOD PRESSURE: 135 MMHG | WEIGHT: 163.8 LBS | HEIGHT: 70 IN | HEART RATE: 60 BPM

## 2021-08-04 DIAGNOSIS — R23.4 PEELING SKIN: ICD-10-CM

## 2021-08-04 DIAGNOSIS — K13.79 MOUTH SORES: Primary | ICD-10-CM

## 2021-08-04 DIAGNOSIS — K13.0 LIP LESION: ICD-10-CM

## 2021-08-04 PROCEDURE — 88305 TISSUE EXAM BY PATHOLOGIST: CPT | Performed by: PATHOLOGY

## 2021-08-04 PROCEDURE — 99213 OFFICE O/P EST LOW 20 MIN: CPT | Mod: 25 | Performed by: OTOLARYNGOLOGY

## 2021-08-04 PROCEDURE — 40490 BIOPSY OF LIP: CPT | Performed by: OTOLARYNGOLOGY

## 2021-08-04 ASSESSMENT — PAIN SCALES - GENERAL: PAINLEVEL: NO PAIN (0)

## 2021-08-04 ASSESSMENT — MIFFLIN-ST. JEOR: SCORE: 1529.25

## 2021-08-04 NOTE — PROGRESS NOTES
Dear Dr. Denton:    I had the pleasure of seeing Blas Stout in follow-up today at the [facility].     History of Present Illness:   Blas Stout is a 65 year old man initially referred for evaluation of oral lesions in January 2021. He saw Dr Denton in August 2020. At that time he had a 5-6 month history of a sore along the lip. This was evaluated by his dentist and treated with nystatin without improvement. He then was given Triamcinolone with some improvement but not complete resolution. He was then given magic mouthrinse. At that time the sore was intermittent. Dr Denton recommended kenalog and adjustment of oral hygiene products to biotene. He saw Dr Denton in follow-up in January 2021 at which time he reported the spots were larger and more symptomatic.     Interval history:  He comes in today for follow-up.  He was last seen in January 2021.  At that time the lesions had largely resolved.  The only change at that time had been that he had stopped drinking alcohol.  He was encouraged to contact us if the lesions recurred.  He reached out because he feels like he is still having issues.  He says that his lips will peel and the skin lining inside his mouth will come off when he eats.  He sometimes will bite the area.  He says that there has been a bump along the left side of his lower lip that has been there for several years and has never bothered him other than feeling it with his tongue.  He says that the area resolves overnight and is not present when he first wakes in the morning.  He is not having any pain.  He has not noticed any changes with his diet.  He does feel like spicy foods will sometimes irritate it.  He knows the pistachios do irritate it but shala will help it.  He is not using any mouth rinses or paste at the moment.  He does note that he also has peeling of the skin on his hands and his feet.  He has not seen dermatology.        MEDICATIONS:     Current  Outpatient Medications   Medication Sig Dispense Refill     atorvastatin (LIPITOR) 10 MG tablet Take 10 mg by mouth daily       sildenafil (REVATIO) 20 MG tablet TAKE 1-3 PILLS AT LEAST 1/2-HOUR BEFORE INTERCOURSE       sildenafil (VIAGRA) 50 MG tablet Take 1 tablet by mouth once daily if needed for Erectile Dysfunction. Take 30 min to 4 hours before sexual activity. Max 100mg/24hr       triamcinolone (KENALOG) 0.1 % paste Take by mouth 2 times daily (Patient not taking: Reported on 8/4/2021) 5 g 11       ALLERGIES:    Allergies   Allergen Reactions     Penicillins Rash       HABITS/SOCIAL HISTORY:   Some smoking in teens. No chewing tobacco. Alcohol 3-4 times per week, have few glasses.   Retired 1 year ago.     Social History     Socioeconomic History     Marital status:      Spouse name: Not on file     Number of children: Not on file     Years of education: Not on file     Highest education level: Not on file   Occupational History     Not on file   Tobacco Use     Smoking status: Never Smoker     Smokeless tobacco: Never Used   Substance and Sexual Activity     Alcohol use: Not on file     Drug use: Not on file     Sexual activity: Not on file   Other Topics Concern     Not on file   Social History Narrative     Not on file     Social Determinants of Health     Financial Resource Strain:      Difficulty of Paying Living Expenses:    Food Insecurity:      Worried About Running Out of Food in the Last Year:      Ran Out of Food in the Last Year:    Transportation Needs:      Lack of Transportation (Medical):      Lack of Transportation (Non-Medical):    Physical Activity:      Days of Exercise per Week:      Minutes of Exercise per Session:    Stress:      Feeling of Stress :    Social Connections:      Frequency of Communication with Friends and Family:      Frequency of Social Gatherings with Friends and Family:      Attends Taoist Services:      Active Member of Clubs or Organizations:      Attends  "Club or Organization Meetings:      Marital Status:    Intimate Partner Violence:      Fear of Current or Ex-Partner:      Emotionally Abused:      Physically Abused:      Sexually Abused:        PAST MEDICAL HISTORY: No past medical history on file.     PAST SURGICAL HISTORY: No past surgical history on file.    FAMILY HISTORY:  No family history on file.    REVIEW OF SYSTEMS:  12 point ROS was negative other than the symptoms noted above in the HPI.  Patient Supplied Answers to Review of Systems  No flowsheet data found.      PHYSICAL EXAMINATION:   /73   Pulse 60   Temp 97.3  F (36.3  C) (Temporal)   Ht 1.778 m (5' 10\")   Wt 74.3 kg (163 lb 12.8 oz)   SpO2 98%   BMI 23.50 kg/m     Appearance:   normal; NAD, age-appropriate appearance, well-developed, normal habitus   Communication:   normal; communicates verbally, normal voice quality   Head/Face:   inspection -  Normal; no scars or visible lesions   Skin:  normal, no rash   Ears:  auricle (AD) -  normal  EAC (AD) -  normal  TM (AD) -  Normal, no effusion  auricle (AS) -  normal  EAC (AS) -  normal  TM (AS) -  Normal, no effusion  Normal clinical speech reception   Nose:  Ext. inspection -  Normal   Oral Cavity:  lips -  Normal oral competence and stoma size  Along the lower lip there are palpable minor salivary glands but no masses.  There is missing epithelial layer along the lower lip.  There is a very faint area of leukoplakia that is mild.   Age-appropriate dentition, healthy gingival mucosa   Hard palate, buccal, floor of mouth mucosa normal   Tongue - normal movement, no lesions   Oropharynx:  mucosa -  Normal, no lesions  soft palate -  Normal, no lesions, no asymmetry, normal elevation  tonsils -  Normal, no exudates, no abnormal lesions, symmetric   Neck: No visible mass or asymmetry   Normal palpation, no tenderness, no tracheal deviation  Normal range of motion   Lymphatic:  no abnormal nodes   Cardiovascular: warm, pink, well-perfused " extremities without swelling, tenderness, or edema   Respiratory:  Normal respiratory effort, no stridor   Neuro/Psych.:  mood/affect -  normal  mental status -  normal         PROCEDURE:  Lip biopsy: Lip biopsy was indicated due to skin changes and leukoplakia.  The area was topically anesthetized with 1% lidocaine with 1-100,000 epinephrine.  A 3 mm biopsy was obtained using a punch.  Specimen was placed in formalin.  Hemostasis was achieved with silver nitrate cautery.  Patient tolerated the procedure well with no immediate complications.    RESULTS REVIEWED:       IMPRESSION AND PLAN:   Blas Stout is a 65-year-old man who was referred for evaluation of lip lesions back in January 2021.  The lesions had resolved at the time of his last visit.  He feels like they have recurred.  On further discussion it is largely peeling of his lower lip mucosa which is partially trauma related.  Of note he does have peeling skin on his hands and feet.  I did perform a biopsy today just to rule out any sort of abnormalities.  I have a low suspicion of a malignancy given the appearance and the history.  We will contact him with the results.  We will place a dermatology referral for his skin concerns.    Thank you very much for the opportunity to participate in the care of your patient.      Daly Zapata MD, M.D.  Otolaryngology- Head & Neck Surgery      This note was dictated with voice recognition software and then edited. Please excuse any unintentional errors.       CC:  [place] [address]

## 2021-08-04 NOTE — LETTER
8/4/2021       RE: Blas Stout  [address] [address] [facility] [address] [address]     Dear Colleague,    Thank you for referring your patient, Blas Stout, to the [facility] THRO[facility]IN[facility] [place] at [facility]. Please see a copy of my visit note below.    Dear Dr. Denton:    I had the pleasure of seeing Blas Stout in follow-up today at the [facility].     History of Present Illness:   Blas Stout is a 65 year old man initially referred for evaluation of oral lesions in January 2021. He saw Dr Denton in August 2020. At that time he had a 5-6 month history of a sore along the lip. This was evaluated by his dentist and treated with nystatin without improvement. He then was given Triamcinolone with some improvement but not complete resolution. He was then given magic mouthrinse. At that time the sore was intermittent. Dr Denton recommended kenalog and adjustment of oral hygiene products to biotene. He saw Dr Denton in follow-up in January 2021 at which time he reported the spots were larger and more symptomatic.     Interval history:  He comes in today for follow-up.  He was last seen in January 2021.  At that time the lesions had largely resolved.  The only change at that time had been that he had stopped drinking alcohol.  He was encouraged to contact us if the lesions recurred.  He reached out because he feels like he is still having issues.  He says that his lips will peel and the skin lining inside his mouth will come off when he eats.  He sometimes will bite the area.  He says that there has been a bump along the left side of his lower lip that has been there for several years and has never bothered him other than feeling it with his tongue.  He says that the area resolves overnight and is not present when he first wakes in the morning.  He is not having any pain.  He has not noticed any changes with his diet.  He  does feel like spicy foods will sometimes irritate it.  He knows the pistachios do irritate it but shala will help it.  He is not using any mouth rinses or paste at the moment.  He does note that he also has peeling of the skin on his hands and his feet.  He has not seen dermatology.        MEDICATIONS:     Current Outpatient Medications   Medication Sig Dispense Refill     atorvastatin (LIPITOR) 10 MG tablet Take 10 mg by mouth daily       sildenafil (REVATIO) 20 MG tablet TAKE 1-3 PILLS AT LEAST 1/2-HOUR BEFORE INTERCOURSE       sildenafil (VIAGRA) 50 MG tablet Take 1 tablet by mouth once daily if needed for Erectile Dysfunction. Take 30 min to 4 hours before sexual activity. Max 100mg/24hr       triamcinolone (KENALOG) 0.1 % paste Take by mouth 2 times daily (Patient not taking: Reported on 8/4/2021) 5 g 11       ALLERGIES:    Allergies   Allergen Reactions     Penicillins Rash       HABITS/SOCIAL HISTORY:   Some smoking in teens. No chewing tobacco. Alcohol 3-4 times per week, have few glasses.   Retired 1 year ago.     Social History     Socioeconomic History     Marital status:      Spouse name: Not on file     Number of children: Not on file     Years of education: Not on file     Highest education level: Not on file   Occupational History     Not on file   Tobacco Use     Smoking status: Never Smoker     Smokeless tobacco: Never Used   Substance and Sexual Activity     Alcohol use: Not on file     Drug use: Not on file     Sexual activity: Not on file   Other Topics Concern     Not on file   Social History Narrative     Not on file     Social Determinants of Health     Financial Resource Strain:      Difficulty of Paying Living Expenses:    Food Insecurity:      Worried About Running Out of Food in the Last Year:      Ran Out of Food in the Last Year:    Transportation Needs:      Lack of Transportation (Medical):      Lack of Transportation (Non-Medical):    Physical Activity:      Days of Exercise  "per Week:      Minutes of Exercise per Session:    Stress:      Feeling of Stress :    Social Connections:      Frequency of Communication with Friends and Family:      Frequency of Social Gatherings with Friends and Family:      Attends [employer] Services:      Active Member of Clubs or Organizations:      Attends Club or Organization Meetings:      Marital Status:    Intimate Partner Violence:      Fear of Current or Ex-Partner:      Emotionally Abused:      Physically Abused:      Sexually Abused:        PAST MEDICAL HISTORY: No past medical history on file.     PAST SURGICAL HISTORY: No past surgical history on file.    FAMILY HISTORY:  No family history on file.    REVIEW OF SYSTEMS:  12 point ROS was negative other than the symptoms noted above in the HPI.  Patient Supplied Answers to Review of Systems  No flowsheet data found.      PHYSICAL EXAMINATION:   /73   Pulse 60   Temp 97.3  F (36.3  C) (Temporal)   Ht 1.778 m (5' 10\")   Wt 74.3 kg (163 lb 12.8 oz)   SpO2 98%   BMI 23.50 kg/m     Appearance:   normal; NAD, age-appropriate appearance, well-developed, normal habitus   Communication:   normal; communicates verbally, normal voice quality   Head/Face:   inspection -  Normal; no scars or visible lesions   Skin:  normal, no rash   Ears:  auricle (AD) -  normal  EAC (AD) -  normal  TM (AD) -  Normal, no effusion  auricle (AS) -  normal  EAC (AS) -  normal  TM (AS) -  Normal, no effusion  Normal clinical speech reception   Nose:  Ext. inspection -  Normal   Oral Cavity:  lips -  Normal oral competence and stoma size  Along the lower lip there are palpable minor salivary glands but no masses.  There is missing epithelial layer along the lower lip.  There is a very faint area of leukoplakia that is mild.   Age-appropriate dentition, healthy gingival mucosa   Hard palate, buccal, floor of mouth mucosa normal   Tongue - normal movement, no lesions   Oropharynx:  mucosa -  Normal, no lesions  soft " palate -  Normal, no lesions, no asymmetry, normal elevation  tonsils -  Normal, no exudates, no abnormal lesions, symmetric   Neck: No visible mass or asymmetry   Normal palpation, no tenderness, no tracheal deviation  Normal range of motion   Lymphatic:  no abnormal nodes   Cardiovascular: warm, pink, well-perfused extremities without swelling, tenderness, or edema   Respiratory:  Normal respiratory effort, no stridor   Neuro/Psych.:  mood/affect -  normal  mental status -  normal         PROCEDURE:  Lip biopsy: Lip biopsy was indicated due to skin changes and leukoplakia.  The area was topically anesthetized with 1% lidocaine with 1-100,000 epinephrine.  A 3 mm biopsy was obtained using a punch.  Specimen was placed in formalin.  Hemostasis was achieved with silver nitrate cautery.  Patient tolerated the procedure well with no immediate complications.    RESULTS REVIEWED:       IMPRESSION AND PLAN:   Blas Stout is a 65-year-old man who was referred for evaluation of lip lesions back in January 2021.  The lesions had resolved at the time of his last visit.  He feels like they have recurred.  On further discussion it is largely peeling of his lower lip mucosa which is partially trauma related.  Of note he does have peeling skin on his hands and feet.  I did perform a biopsy today just to rule out any sort of abnormalities.  I have a low suspicion of a malignancy given the appearance and the history.  We will contact him with the results.  We will place a dermatology referral for his skin concerns.    Thank you very much for the opportunity to participate in the care of your patient.      Daly Zapata MD, M.D.  Otolaryngology- Head & Neck Surgery      This note was dictated with voice recognition software and then edited. Please excuse any unintentional errors.       CC:  [place] [address]

## 2021-08-04 NOTE — NURSING NOTE
"Chief Complaint   Patient presents with     RECHECK     continued lip lesions, possible biopsy       Blood pressure 135/73, pulse 60, temperature 97.3  F (36.3  C), temperature source Temporal, height 1.778 m (5' 10\"), weight 74.3 kg (163 lb 12.8 oz), SpO2 98 %.    Sarah Freeman, EMT    "

## 2021-08-05 ENCOUNTER — PATIENT OUTREACH (OUTPATIENT)
Dept: OTOLARYNGOLOGY | Facility: CLINIC | Age: 66
End: 2021-08-05

## 2021-08-05 LAB
PATH REPORT.COMMENTS IMP SPEC: NORMAL
PATH REPORT.COMMENTS IMP SPEC: NORMAL
PATH REPORT.FINAL DX SPEC: NORMAL
PATH REPORT.GROSS SPEC: NORMAL
PATH REPORT.MICROSCOPIC SPEC OTHER STN: NORMAL
PATH REPORT.RELEVANT HX SPEC: NORMAL
PHOTO IMAGE: NORMAL

## 2021-08-05 NOTE — PROGRESS NOTES
Called patient with the following pathology results:    Final Diagnosis   A. 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  -Negative for dysplasia or malignancy.        Reviewed with patient that no concerning findings seen on biopsy. He will follow-up with dermatology. He will follow-up with Dr. Zapata as needed. He was encouraged to call with further questions or concerns.    Kellee Lynch, RN, BSN

## 2021-10-01 ENCOUNTER — OFFICE VISIT (OUTPATIENT)
Dept: FAMILY MEDICINE | Facility: CLINIC | Age: 66
End: 2021-10-01
Attending: OTOLARYNGOLOGY
Payer: COMMERCIAL

## 2021-10-01 VITALS — DIASTOLIC BLOOD PRESSURE: 76 MMHG | SYSTOLIC BLOOD PRESSURE: 122 MMHG

## 2021-10-01 DIAGNOSIS — K13.0 LIP LESION: ICD-10-CM

## 2021-10-01 DIAGNOSIS — R23.4 PEELING SKIN: ICD-10-CM

## 2021-10-01 DIAGNOSIS — L28.0 LSC (LICHEN SIMPLEX CHRONICUS): Primary | ICD-10-CM

## 2021-10-01 PROCEDURE — 99203 OFFICE O/P NEW LOW 30 MIN: CPT | Performed by: DERMATOLOGY

## 2021-10-01 RX ORDER — CLOBETASOL PROPIONATE 0.5 MG/G
OINTMENT TOPICAL 2 TIMES DAILY
Qty: 60 G | Refills: 3 | Status: SHIPPED | OUTPATIENT
Start: 2021-10-01

## 2021-10-01 NOTE — Clinical Note
10/1/2021         RE: Blas Stout  [address] [facility] [address]        Dear Colleague,    Thank you for referring your patient, Blas Stout, to the [facility] [place]. Please see a copy of my visit note below.    No notes on file    Again, thank you for allowing me to participate in the care of your patient.        Sincerely,        Florin Evangelista MD

## 2021-10-01 NOTE — PATIENT INSTRUCTIONS
Urea 30% cream - Eucerin Urea Repair is available OTC  Hands, feet, elbows    Sebaceous glands (oil glands) diminish in activity with increasing wisdom.    Vaseline to lips multiple times daily    Recommendations for dry skin and dermatitis   1. Bathe or shower daily in lukewarm water  2. Use a gentle non-soap detergent cleanser  - Soaps are alkaline (which can irritate sensitive skin) and remove natural moisturizing factors   - Recommended products, in no particular order, include:   - Bars:    - Aveeno Moisturizing Bar    - Cetaphil Gentle Cleansing Bar    - Dove Sensitive Skin Unscented Beauty Bar    - Olay Ultra Moisture Bar   - Liquid Cleansers:    - Aquanil Cleanser    - CeraVe Hydrating Cleanser    - Cetaphil Gentle Skin Cleanser  - Avoid scented soaps or bath additives unless your doctor tells you otherwise  - Focus on washing the face, underarms, and underwear areas; other sites usually do not need frequent washing  3. Rinse off thoroughly, then pat dry until skin is slightly damp  4. Apply moisturizer to damp skin within 3-5 minutes of exiting the bath/shower  - Recommended products, in no particular order, include:   - Lotions (thinner/lighter, but may be less effective)    - AmLactin Cerapeutic Restoring Body Lotion    - CeraVe Facial Moisturizing Lotion (AM and/or PM)    - Lubriderm Advanced Therapy Lotion   - Creams (thicker, likely the best balance of effectiveness and feel)    - AmLactin Ultra Hydrating Body Cream    - Aveeno Eczema Therapy Moisturizing Cream    - Aveeno Eczema Therapy Itch Relief Balm    - CeraVe Itch Relief Moisturizing Cream   - Ointments (thickest)    - Vaseline  5. If prescribed a topical steroid medication, this may be applied before or after the moisturizer (whichever order you prefer)  6. Reapply moisturizer one or two additional times throughout the day when dry skin is present; once this improves, reduce to daily or every other day as needed to prevent recurrence  7. If dry  skin or dermatitis is present on the hands, keep moisturizer near the sink and apply after washing and drying your hands  8. A humidifier may be helpful during the winter months (when ambient humidity is very low)

## 2021-10-04 ENCOUNTER — HEALTH MAINTENANCE LETTER (OUTPATIENT)
Age: 66
End: 2021-10-04

## 2021-10-29 NOTE — PROGRESS NOTES
[facility] Dermatology Clinic Note - EP    Encounter Date: Oct 1, 2021    Dermatology Problem List:  #. Acanthotic, peeling plaque of the lower lip    ____________________________________________    Assessment & Plan:     #. Acanthotic, peeling plaque of the lower lip  - Seems somewhat akin to lichen simplex chronicus - potentially licked/picked/rubbed habitually  - Parakeratosis and hyperkeratosis on biopsy, no malignancy  - After discussion of side effects and alternatives, prescribed clobetasol 0.05% ointment for use BID to determine if this is inflammatory in nature    Procedures Performed:   None    Follow-up: 3 months    Florin Evangelista MD  Dermatology/Dermatopathology Staff Physician  , Department of Dermatology    ____________________________________________    CC: Derm Problem (c/o increased peeling skin on hands,feet and elbows, also look at lesion on lip)      HPI:  Mr. Blas Stout is a(n) extremely pleasant 66 year old male who presents today as a new patient for peeling of skin of the lower lip, as well as the hands and elbows.    Notes onset this summer, no prior episodes. He notes his lip is overall well in the AM, but after breakfast some irritation begins. He has tried nystatin, magic mouthwash with no benefit. He reports shala helps. He does admit to scraping the skin off from time to time.     The patient denies any painful, bleeding, or nonhealing lesions, or any new or changing moles.    Patient is otherwise feeling well, without additional skin concerns.     Labs Reviewed:  N/A    Physical Exam:  Vitals: /76   SKIN: Waist-up skin, which includes the head/face, neck, both arms, chest, back, abdomen, digits and/or nails was examined.  - Acanthotic, peeling plaque of the lower lip  - Eczematous dermatitis of the hands and elbows  - No other lesions of concern on areas examined.     Medications:  Current Outpatient Medications   Medication     clobetasol (TEMOVATE) 0.05  % external ointment     atorvastatin (LIPITOR) 10 MG tablet     sildenafil (REVATIO) 20 MG tablet     sildenafil (VIAGRA) 50 MG tablet     triamcinolone (KENALOG) 0.1 % paste     No current facility-administered medications for this visit.      Past Medical History:   Patient Active Problem List   Diagnosis     Hypercholesterolemia     Leukoplakia of oral mucosa, including tongue     No past medical history on file.    CC Daly Zapata MD  [address] [place] [id number] on close of this encounter.    This note has been created using voice recognition software; while it has been reviewed, some errors may persist.

## 2022-01-26 ENCOUNTER — MYC MEDICAL ADVICE (OUTPATIENT)
Dept: OTOLARYNGOLOGY | Facility: CLINIC | Age: 67
End: 2022-01-26
Payer: COMMERCIAL

## 2022-01-27 NOTE — TELEPHONE ENCOUNTER
Patient is scheduled for 2-4-22 with [facility] and Dr. Denton. Chico Goss CMA on 1/27/2022 at 11:40 AM

## 2022-02-01 NOTE — PROGRESS NOTES
History of Present Illness - Blas Stout is a very pleasant 66 year old male here to see me for the first itme for tinnitus.  I have seen him before years ago due to lesions on the lip.  Ieventually sent him to the , and biopsy was negative.    About 6 months ago he had his Covid vaccination and he noticed a quiet cricket noise the next day, in both ears eventually and it has been persistent since then.    Otherwise no history of chronic ear disease.  No previous ear surgery.  No history of chemo or radiation therapy to the head and neck, and no major head trauma.  He denies a history of  service, no frequent firearm use.  No previous history working in an industrial environment.      Past Medical History -   Patient Active Problem List   Diagnosis     Hypercholesterolemia     Leukoplakia of oral mucosa, including tongue       Current Medications -   Current Outpatient Medications:      atorvastatin (LIPITOR) 10 MG tablet, Take 10 mg by mouth daily, Disp: , Rfl:      clobetasol (TEMOVATE) 0.05 % external ointment, Apply topically 2 times daily For the scaly area on the lip, Disp: 60 g, Rfl: 3     sildenafil (REVATIO) 20 MG tablet, TAKE 1-3 PILLS AT LEAST 1/2-HOUR BEFORE INTERCOURSE, Disp: , Rfl:      sildenafil (VIAGRA) 50 MG tablet, Take 1 tablet by mouth once daily if needed for Erectile Dysfunction. Take 30 min to 4 hours before sexual activity. Max 100mg/24hr, Disp: , Rfl:      triamcinolone (KENALOG) 0.1 % paste, Take by mouth 2 times daily (Patient not taking: Reported on 8/4/2021), Disp: 5 g, Rfl: 11    Allergies -   Allergies   Allergen Reactions     Penicillins Rash       Social History -   Social History     Socioeconomic History     Marital status:      Spouse name: Not on file     Number of children: Not on file     Years of education: Not on file     Highest education level: Not on file   Occupational History     Not on file   Tobacco Use     Smoking status: Never Smoker     Smokeless  tobacco: Never Used   Substance and Sexual Activity     Alcohol use: Not on file     Drug use: Not on file     Sexual activity: Not on file   Other Topics Concern     Not on file   Social History Narrative     Not on file     Social Determinants of Health     Financial Resource Strain: Not on file   Food Insecurity: Not on file   Transportation Needs: Not on file   Physical Activity: Not on file   Stress: Not on file   Social Connections: Not on file   Intimate Partner Violence: Not on file   Housing Stability: Not on file       Family History - No family history on file.    Review of Systems - As per HPI and PMHx, otherwise 10+ system review of the head and neck, and general constitution is negative.    Physical Exam  There were no vitals taken for this visit.    General - The patient is well nourished and well developed, and appears to have good nutritional status.  Alert and oriented to person and place, answers questions and cooperates with examination appropriately.   Head and Face - Normocephalic and atraumatic, with no gross asymmetry noted of the contour of the facial features.  The facial nerve is intact, with strong symmetric movements.  Voice and Breathing - The patient was breathing comfortably without the use of accessory muscles. There was no wheezing, stridor, or stertor.  The patients voice was clear and strong, and had appropriate pitch and quality.  Ears - The tympanic membranes are normal in appearance, bony landmarks are intact.  No retraction, perforation, or masses.  No fluid or purulence was seen in the external canal or the middle ear. No evidence of infection of the middle ear or external canal, cerumen was normal in appearance.  Eyes - Extraocular movements intact, and the pupils were reactive to light.  Sclera were not icteric or injected, conjunctiva were pink and moist.  Mouth - Examination of the oral cavity showed pink, healthy oral mucosa. No lesions or ulcerations noted.  The tongue  was mobile and midline, and the dentition were in good condition.    Throat - The walls of the oropharynx were smooth, pink, moist, symmetric, and had no lesions or ulcerations.  The tonsillar pillars and soft palate were symmetric.  The uvula was midline on elevation.    Neck - Normal midline excursion of the laryngotracheal complex during swallowing.  Full range of motion on passive movement.  Palpation of the occipital, submental, submandibular, internal jugular chain, and supraclavicular nodes did not demonstrate any abnormal lymph nodes or masses.  The carotid pulse was palpable bilaterally.  Palpation of the thyroid was soft and smooth, with no nodules or goiter appreciated.  The trachea was mobile and midline.  Nose - External contour is symmetric, no gross deflection or scars.  Nasal mucosa is pink and moist with no abnormal mucus.  The septum was midline and non-obstructive, turbinates of normal size and position.  No polyps, masses, or purulence noted on examination.    Audiologic Studies - An audiogram and tympanogram were performed today as part of the evaluation and personally reviewed. The tympanogram shows a normal Type A curve, with normal canal volume and middle ear pressure.  There is no sign of eustachian tube dysfunction or middle ear effusion.    The audiogram shows a symmetric steeply down sloping sensorineural hearing loss above 2000Hz bilaterally.  No conductive hearing loss, good word recognition scores.      A/P - Blas Stout is a 66 year old male  (H93.13) Tinnitus, bilateral  (primary encounter diagnosis)  (H90.3) Sensorineural hearing loss (SNHL) of both ears    We spent the remainder of today's visit discussing the current leading theory on tinnitus, in that it originates from the central nervous system itself, similar to Phantom Limb Syndrome.  Also discussed were steps that can be taken to mask the noise, such as a low volume de-tuned radio, a fan in the background, and hearing  aids.  Correlation with stress, anxiety, depression, and high blood pressure were also discussed.  The patient was also cautioned on the numerous expensive non-pharmaceutical options that are advertised, and have no proven benefit.    The patient will follow up as necessary for worsening symptoms, changes in symptoms, or signs of infection.  I have also recommended yearly audiograms, and consideration of a hearing aid evaluation.

## 2022-02-04 ENCOUNTER — OFFICE VISIT (OUTPATIENT)
Dept: OTOLARYNGOLOGY | Facility: CLINIC | Age: 67
End: 2022-02-04
Payer: COMMERCIAL

## 2022-02-04 ENCOUNTER — OFFICE VISIT (OUTPATIENT)
Dept: AUDIOLOGY | Facility: CLINIC | Age: 67
End: 2022-02-04
Payer: COMMERCIAL

## 2022-02-04 DIAGNOSIS — H93.13 TINNITUS, BILATERAL: Primary | ICD-10-CM

## 2022-02-04 DIAGNOSIS — H90.3 SENSORINEURAL HEARING LOSS, BILATERAL: ICD-10-CM

## 2022-02-04 DIAGNOSIS — J34.89 NASAL VESTIBULITIS: ICD-10-CM

## 2022-02-04 DIAGNOSIS — H90.3 SENSORINEURAL HEARING LOSS (SNHL) OF BOTH EARS: ICD-10-CM

## 2022-02-04 DIAGNOSIS — H93.13 TINNITUS OF BOTH EARS: Primary | ICD-10-CM

## 2022-02-04 PROCEDURE — 92557 COMPREHENSIVE HEARING TEST: CPT

## 2022-02-04 PROCEDURE — 99207 PR NO CHARGE LOS: CPT

## 2022-02-04 PROCEDURE — 92567 TYMPANOMETRY: CPT

## 2022-02-04 PROCEDURE — 99213 OFFICE O/P EST LOW 20 MIN: CPT | Performed by: OTOLARYNGOLOGY

## 2022-02-04 RX ORDER — CLINDAMYCIN PHOSPHATE 10 MG/G
GEL TOPICAL 2 TIMES DAILY
Qty: 30 G | Refills: 11 | Status: SHIPPED | OUTPATIENT
Start: 2022-02-04

## 2022-02-04 NOTE — PROGRESS NOTES
AUDIOLOGY REPORT:    Patient was referred to [facility] from ENT by Dr. Denton for a hearing examination. Patient reports new onset of bilateral tinnitus starting right after he received his second COVID-19 vaccine shot (6 months ago). He denies pain, pressure, drainage, dizziness, family history of hearing loss and excessive noise exposure.     Testing:    Otoscopy:   Otoscopic exam indicates ears are clear of cerumen bilaterally     Tympanograms:    RIGHT: normal eardrum mobility     LEFT:   normal eardrum mobility    Reflexes (reported by stimulus ear): 1000 Hz  Could not maintain seal    Thresholds:   Pure Tone Thresholds assessed using conventional audiometry with good  reliability from 250-8000 Hz bilaterally using insert earphones and circumaural headphones     RIGHT:  normal sloping to moderate sensorineural hearing loss    LEFT:    normal sloping to moderate sensorineural hearing loss    Speech Reception Threshold:    RIGHT: 15 dB HL    LEFT:   15 dB HL  Speech Reception Thresholds are in good agreement with pure tone thresholds    Word Recognition Score:     RIGHT: 100% at 55 dB HL using NU-6 recorded word list.    LEFT:   96% at 55 dB HL using NU-6 recorded word list.    Discussed results with the patient. Hearing aids were discussed regarding hearing loss and possible tinnitus relief, however, patient is not interested at this time.     Patient was returned to ENT for follow up.     Elodia Contreras, CCC-A  Licensed Audiologist  [place] #[id number]    02/04/22

## 2022-02-04 NOTE — LETTER
2/4/2022         RE: Blas Stout  [address] [facility] [address]        Dear Colleague,    Thank you for referring your patient, Blas Stout, to the [facility]. Please see a copy of my visit note below.    History of Present Illness - Blas Stout is a very pleasant 66 year old male here to see me for the first itme for tinnitus.  I have seen him before years ago due to lesions on the lip.  Ieventually sent him to the , and biopsy was negative.    About 6 months ago he had his Covid vaccination and he noticed a quiet cricket noise the next day, in both ears eventually and it has been persistent since then.    Otherwise no history of chronic ear disease.  No previous ear surgery.  No history of chemo or radiation therapy to the head and neck, and no major head trauma.  He denies a history of  service, no frequent firearm use.  No previous history working in an industrial environment.      Past Medical History -   Patient Active Problem List   Diagnosis     Hypercholesterolemia     Leukoplakia of oral mucosa, including tongue       Current Medications -   Current Outpatient Medications:      atorvastatin (LIPITOR) 10 MG tablet, Take 10 mg by mouth daily, Disp: , Rfl:      clobetasol (TEMOVATE) 0.05 % external ointment, Apply topically 2 times daily For the scaly area on the lip, Disp: 60 g, Rfl: 3     sildenafil (REVATIO) 20 MG tablet, TAKE 1-3 PILLS AT LEAST 1/2-HOUR BEFORE INTERCOURSE, Disp: , Rfl:      sildenafil (VIAGRA) 50 MG tablet, Take 1 tablet by mouth once daily if needed for Erectile Dysfunction. Take 30 min to 4 hours before sexual activity. Max 100mg/24hr, Disp: , Rfl:      triamcinolone (KENALOG) 0.1 % paste, Take by mouth 2 times daily (Patient not taking: Reported on 8/4/2021), Disp: 5 g, Rfl: 11    Allergies -   Allergies   Allergen Reactions     Penicillins Rash       Social History -   Social History     Socioeconomic History     Marital status:       Spouse name: Not on file     Number of children: Not on file     Years of education: Not on file     Highest education level: Not on file   Occupational History     Not on file   Tobacco Use     Smoking status: Never Smoker     Smokeless tobacco: Never Used   Substance and Sexual Activity     Alcohol use: Not on file     Drug use: Not on file     Sexual activity: Not on file   Other Topics Concern     Not on file   Social History Narrative     Not on file     Social Determinants of Health     Financial Resource Strain: Not on file   Food Insecurity: Not on file   Transportation Needs: Not on file   Physical Activity: Not on file   Stress: Not on file   Social Connections: Not on file   Intimate Partner Violence: Not on file   Housing Stability: Not on file       Family History - No family history on file.    Review of Systems - As per HPI and PMHx, otherwise 10+ system review of the head and neck, and general constitution is negative.    Physical Exam  There were no vitals taken for this visit.    General - The patient is well nourished and well developed, and appears to have good nutritional status.  Alert and oriented to person and place, answers questions and cooperates with examination appropriately.   Head and Face - Normocephalic and atraumatic, with no gross asymmetry noted of the contour of the facial features.  The facial nerve is intact, with strong symmetric movements.  Voice and Breathing - The patient was breathing comfortably without the use of accessory muscles. There was no wheezing, stridor, or stertor.  The patients voice was clear and strong, and had appropriate pitch and quality.  Ears - The tympanic membranes are normal in appearance, bony landmarks are intact.  No retraction, perforation, or masses.  No fluid or purulence was seen in the external canal or the middle ear. No evidence of infection of the middle ear or external canal, cerumen was normal in appearance.  Eyes -  Extraocular movements intact, and the pupils were reactive to light.  Sclera were not icteric or injected, conjunctiva were pink and moist.  Mouth - Examination of the oral cavity showed pink, healthy oral mucosa. No lesions or ulcerations noted.  The tongue was mobile and midline, and the dentition were in good condition.    Throat - The walls of the oropharynx were smooth, pink, moist, symmetric, and had no lesions or ulcerations.  The tonsillar pillars and soft palate were symmetric.  The uvula was midline on elevation.    Neck - Normal midline excursion of the laryngotracheal complex during swallowing.  Full range of motion on passive movement.  Palpation of the occipital, submental, submandibular, internal jugular chain, and supraclavicular nodes did not demonstrate any abnormal lymph nodes or masses.  The carotid pulse was palpable bilaterally.  Palpation of the thyroid was soft and smooth, with no nodules or goiter appreciated.  The trachea was mobile and midline.  Nose - External contour is symmetric, no gross deflection or scars.  Nasal mucosa is pink and moist with no abnormal mucus.  The septum was midline and non-obstructive, turbinates of normal size and position.  No polyps, masses, or purulence noted on examination.    Audiologic Studies - An audiogram and tympanogram were performed today as part of the evaluation and personally reviewed. The tympanogram shows a normal Type A curve, with normal canal volume and middle ear pressure.  There is no sign of eustachian tube dysfunction or middle ear effusion.    The audiogram shows a symmetric steeply down sloping sensorineural hearing loss above 2000Hz bilaterally.  No conductive hearing loss, good word recognition scores.      A/P - Blas Stout is a 66 year old male  (H93.13) Tinnitus, bilateral  (primary encounter diagnosis)  (H90.3) Sensorineural hearing loss (SNHL) of both ears    We spent the remainder of today's visit discussing the current  leading theory on tinnitus, in that it originates from the central nervous system itself, similar to Phantom Limb Syndrome.  Also discussed were steps that can be taken to mask the noise, such as a low volume de-tuned radio, a fan in the background, and hearing aids.  Correlation with stress, anxiety, depression, and high blood pressure were also discussed.  The patient was also cautioned on the numerous expensive non-pharmaceutical options that are advertised, and have no proven benefit.    The patient will follow up as necessary for worsening symptoms, changes in symptoms, or signs of infection.  I have also recommended yearly audiograms, and consideration of a hearing aid evaluation.          Again, thank you for allowing me to participate in the care of your patient.        Sincerely,        Antonio Denton MD

## 2022-02-18 ENCOUNTER — OFFICE VISIT (OUTPATIENT)
Dept: FAMILY MEDICINE | Facility: CLINIC | Age: 67
End: 2022-02-18
Payer: COMMERCIAL

## 2022-02-18 VITALS — DIASTOLIC BLOOD PRESSURE: 60 MMHG | SYSTOLIC BLOOD PRESSURE: 122 MMHG

## 2022-02-18 DIAGNOSIS — L21.9 SEBORRHEIC DERMATITIS: ICD-10-CM

## 2022-02-18 DIAGNOSIS — L24.9 IRRITANT DERMATITIS: Primary | ICD-10-CM

## 2022-02-18 PROCEDURE — 99214 OFFICE O/P EST MOD 30 MIN: CPT | Performed by: DERMATOLOGY

## 2022-02-18 RX ORDER — TRIAMCINOLONE ACETONIDE 1 MG/G
CREAM TOPICAL 2 TIMES DAILY
Qty: 80 G | Refills: 3 | Status: SHIPPED | OUTPATIENT
Start: 2022-02-18

## 2022-02-18 NOTE — PROGRESS NOTES
[facility] Dermatology Clinic Note - EP    Encounter Date: Feb 18, 2022    Dermatology Problem List:  #. Acanthotic, peeling plaque of the lower lip  #. Irritant dermatitis, hands  - [facility] 0.1%  #. Seborrheic dermatitis, chest  - [facility] 0.1%   ____________________________________________    Assessment & Plan:     #. Irritant dermatitis, hands  - Start triamcinolone 0.1% cream BID    #. Seborrheic dermatitis, chest  - Start triamcinolone 0.1% cream BID    Procedures Performed:   None    Follow-up: as previously scheduled.    Scribe Disclosure:  IGenna, am serving as a scribe to document services personally performed by Florin Evangelista MD based on data collection and the provider's statements to me.     Florin Evangelista MD  Dermatology/Dermatopathology Staff Physician  , Department of Dermatology    ____________________________________________    CC: Derm Problem (redness on top of L hand present over the past 8 weeks)      HPI:  Mr. Blas Stout is a(n) extremely pleasant 66 year old male who presents today as a return patient for spot check. Last seen by me on 10/1/21, at which time the patient was started on clobetasol 0.05% ointment BID for an acanthotic peeling plaque on his lower lip.   Today, the patient reports a red spot on his left hand that he first noticed after traveling. His skin has now been swelling and peeling. Has tried hydrocortisone with no improvement.    The patient denies any painful, bleeding, or nonhealing lesions, or any new or changing moles.    Patient is otherwise feeling well, without additional skin concerns.     Labs Reviewed:  N/A    Physical Exam:  Vitals: /60   SKIN: Focused examination of the hands and chest was performed.  - Lateral aspect of dorsal surface of the 2nd and 3rd finger on the left hand  - Photo on camera with desquamative dermatitis with a continuous pattern of the 2nd and 3rd digit with involvement of the web space.  - No other lesions  of concern on areas examined.     Medications:  Current Outpatient Medications   Medication     atorvastatin (LIPITOR) 10 MG tablet     clindamycin (CLINDAMAX) 1 % external gel     clobetasol (TEMOVATE) 0.05 % external ointment     sildenafil (VIAGRA) 50 MG tablet     triamcinolone (KENALOG) 0.1 % paste     sildenafil (REVATIO) 20 MG tablet     No current facility-administered medications for this visit.      Past Medical History:   Patient Active Problem List   Diagnosis     Hypercholesterolemia     Leukoplakia of oral mucosa, including tongue     No past medical history on file.    CC No referring provider defined for this encounter. on close of this encounter.    This note has been created using voice recognition software; while it has been reviewed, some errors may persist.

## 2022-02-18 NOTE — LETTER
2/18/2022         RE: Blas Stout  [address]  [facility] [address]        Dear Colleague,    Thank you for referring your patient, Blas Stout, to the [facility]. Please see a copy of my visit note below.    [facility] Note - EP    Encounter Date: Feb 18, 2022    Dermatology Problem List:  #. Acanthotic, peeling plaque of the lower lip  #. Irritant dermatitis, hands  - [facility] 0.1%  #. Seborrheic dermatitis, chest  - [facility] 0.1%   ____________________________________________    Assessment & Plan:     #. Irritant dermatitis, hands  - Start triamcinolone 0.1% cream BID    #. Seborrheic dermatitis, chest  - Start triamcinolone 0.1% cream BID    Procedures Performed:   None    Follow-up: as previously scheduled.    Scribe Disclosure:  I, Genna Ford, am serving as a scribe to document services personally performed by Florin Evangelista MD based on data collection and the provider's statements to me.     Florin Evangelista MD  Dermatology/Dermatopathology Staff Physician  , Department of Dermatology    ____________________________________________    CC: Derm Problem (redness on top of L hand present over the past 8 weeks)      HPI:  Mr. Blas Stout is a(n) extremely pleasant 66 year old male who presents today as a return patient for spot check. Last seen by me on 10/1/21, at which time the patient was started on clobetasol 0.05% ointment BID for an acanthotic peeling plaque on his lower lip.   Today, the patient reports a red spot on his left hand that he first noticed after traveling. His skin has now been swelling and peeling. Has tried hydrocortisone with no improvement.    The patient denies any painful, bleeding, or nonhealing lesions, or any new or changing moles.    Patient is otherwise feeling well, without additional skin concerns.     Labs Reviewed:  N/A    Physical Exam:  Vitals: /60   SKIN: Focused examination of the hands and  chest was performed.  - Lateral aspect of dorsal surface of the 2nd and 3rd finger on the left hand  - Photo on camera with desquamative dermatitis with a continuous pattern of the 2nd and 3rd digit with involvement of the web space.  - No other lesions of concern on areas examined.     Medications:  Current Outpatient Medications   Medication     atorvastatin (LIPITOR) 10 MG tablet     clindamycin (CLINDAMAX) 1 % external gel     clobetasol (TEMOVATE) 0.05 % external ointment     sildenafil (VIAGRA) 50 MG tablet     triamcinolone (KENALOG) 0.1 % paste     sildenafil (REVATIO) 20 MG tablet     No current facility-administered medications for this visit.      Past Medical History:   Patient Active Problem List   Diagnosis     Hypercholesterolemia     Leukoplakia of oral mucosa, including tongue     No past medical history on file.    CC No referring provider defined for this encounter. on close of this encounter.    This note has been created using voice recognition software; while it has been reviewed, some errors may persist.        Again, thank you for allowing me to participate in the care of your patient.        Sincerely,        Florin Evangelista MD

## 2022-03-20 ENCOUNTER — HEALTH MAINTENANCE LETTER (OUTPATIENT)
Age: 67
End: 2022-03-20

## 2022-09-11 ENCOUNTER — HEALTH MAINTENANCE LETTER (OUTPATIENT)
Age: 67
End: 2022-09-11

## 2023-01-22 ENCOUNTER — HEALTH MAINTENANCE LETTER (OUTPATIENT)
Age: 68
End: 2023-01-22

## 2024-02-24 ENCOUNTER — HEALTH MAINTENANCE LETTER (OUTPATIENT)
Age: 69
End: 2024-02-24

## 2025-03-09 ENCOUNTER — HEALTH MAINTENANCE LETTER (OUTPATIENT)
Age: 70
End: 2025-03-09

## (undated) RX ORDER — LIDOCAINE HYDROCHLORIDE AND EPINEPHRINE 10; 10 MG/ML; UG/ML
INJECTION, SOLUTION INFILTRATION; PERINEURAL
Status: DISPENSED
Start: 2021-08-04